# Patient Record
Sex: FEMALE
[De-identification: names, ages, dates, MRNs, and addresses within clinical notes are randomized per-mention and may not be internally consistent; named-entity substitution may affect disease eponyms.]

---

## 2019-02-08 ENCOUNTER — HOSPITAL ENCOUNTER (EMERGENCY)
Dept: HOSPITAL 14 - H.ER | Age: 34
Discharge: HOME | End: 2019-02-08
Payer: MEDICAID

## 2019-02-08 VITALS
DIASTOLIC BLOOD PRESSURE: 76 MMHG | TEMPERATURE: 98.7 F | OXYGEN SATURATION: 100 % | RESPIRATION RATE: 19 BRPM | SYSTOLIC BLOOD PRESSURE: 124 MMHG | HEART RATE: 84 BPM

## 2019-02-08 DIAGNOSIS — T78.3XXA: Primary | ICD-10-CM

## 2019-02-08 NOTE — ED PDOC
HPI: Allergic Reaction


Time Seen by Provider: 02/08/19 12:14


Chief Complaint (Nursing): Allergic Reaction


Chief Complaint (Provider): Allergic Reaction


History Per: Patient


History/Exam Limitations: no limitations


Onset/Duration Of Symptoms: Hrs (x1)


Current Symptoms Are (Timing): Still Present


Additional Complaint(s): 





33 year old female presents to the ED with an allergic reaction with itching and

swelling to her face that began 1 hour pta. Patient reports she ate peanuts 

earlier today and thinks the allergic reaction is from that.  Denies shortness 

of breath or rash.  She states she has itching on the neck and has facial 

swelling.  





PMD: none provided





Past Medical History


Reviewed: Historical Data, Nursing Documentation, Vital Signs


Vital Signs: 





                                Last Vital Signs











Temp  98.8 F   02/08/19 11:56


 


Pulse  94 H  02/08/19 11:56


 


Resp  18   02/08/19 11:56


 


BP  143/79   02/08/19 11:56


 


Pulse Ox  99   02/08/19 11:56














- Medical History


PMH: No Chronic Diseases





- Surgical History


Surgical History: Tonsillectomy





- Family History


Family History: States: Unknown Family Hx





- Home Medications


Home Medications: 


                                Ambulatory Orders











 Medication  Instructions  Recorded


 


Prednisone 50 mg PO DAILY #3 tablet 02/08/19














- Allergies


Allergies/Adverse Reactions: 


                                    Allergies











Allergy/AdvReac Type Severity Reaction Status Date / Time


 


No Known Allergies Allergy   Verified 02/08/19 11:59














Review of Systems


ROS Statement: Except As Marked, All Systems Reviewed And Found Negative


Respiratory: Negative for: Shortness of Breath


Skin: Positive for: Other (Facial swelling and itching; itching to the neck).  

Negative for: Rash





Physical Exam





- Reviewed


Nursing Documentation Reviewed: Yes


Vital Signs Reviewed: Yes





- Physical Exam


Appears: Positive for: Non-toxic, No Acute Distress


Head Exam: Positive for: ATRAUMATIC, NORMOCEPHALIC


Skin: Positive for: Normal Color, Warm, Dry


Eye Exam: Positive for: Normal appearance


Neck: Positive for: Normal, Painless ROM


Cardiovascular/Chest: Positive for: Regular Rate, Rhythm


Respiratory: Positive for: Normal Breath Sounds.  Negative for: Wheezing, 

Respiratory Distress


Extremity: Positive for: Normal ROM


Neurologic/Psych: Positive for: Alert, Oriented.  Negative for: Motor/Sensory 

Deficits


Comments: 





Facial swelling





- ECG


O2 Sat by Pulse Oximetry: 99 (RA)


Pulse Ox Interpretation: Normal





- Progress


Re-evaluation Time: 17:03


Condition: Re-examined, Improved





Disposition





- Clinical Impression


Clinical Impression: 


 Allergic reaction, Angioedema








- Patient ED Disposition


Is Patient to be Admitted: No


Doctor Will See Patient In The: Office


Counseled Patient/Family Regarding: Studies Performed, Diagnosis, Need For 

Followup





- Disposition


Referrals: 


[facility] [Outside]


Disposition: Routine/Home


Disposition Time: 16:30


Condition: GOOD


Additional Instructions: 





RAFAELA YO, thank you for letting us take care of you today. Your provider was

 Lam Donovan MD and you were treated for POSS ALLERGIC REACTION. The 

emergency medical care you received today was directed at your acute symptoms. 

If you were prescribed any medication, please fill it and take as directed. It 

may take several days for your symptoms to resolve. Return to the Emergency 

Department if your symptoms worsen, do not improve, or if you have any other 

problems.





Please contact your doctor or call one of the physicians/clinics you have been 

referred to that are listed on the Patient Visit Information form that is 

included in your discharge packet. Bring any paperwork you were given at 

discharge with you along with any medications you are taking to your follow up 

visit. Our treatment cannot replace ongoing medical care by a primary care 

provider outside of the emergency department.





Thank you for allowing the [facility] team to be part of your care today.








If you had an X-Ray or CT scan: A Radiologist will review the ED reading if any 

change in treatment is needed we will contact you.***





If you had a blood, urine, or wound culture: It will take several days for the 

results, if any change in treatment is needed we will contact you.***





If you had an STI test: It will take 48 hours for the results. Please call after

 1 week if you have not heard back.***


Prescriptions: 


Prednisone 50 mg PO DAILY #3 tablet


Instructions:  Angioedema (DC)





MDMA





- Impression/Plan/Differential Dx


Note:: 





Initial Impression: allergic reaction, angioedema of the face





Initial Plan: 


--Benadryl 25mg PO


--Pepcid 20mg PO


--Prednisone 60mg PO





15:25


Upon reevaluation, patient reports improvement in facial swelling.  She still 

reports palpitations and difficulty breathing.  EKG ordered.


----------------------------------------------------

---------------------------------------------


Scribe Attestation:


Documented by Simón Bailey acting as a scribe for Lam Donovan MD.





Provider Scribe Attestation:


All medical record entries made by the Scribe were at my direction and 

personally dictated by me. I have reviewed the chart and agree that the record 

accurately reflects my personal performance of the history, physical exam, 

medical decision making, and the department course for this patient. I have also

 personally directed, reviewed, and agree with the discharge instructions and 

disposition.

## 2019-02-09 NOTE — CARD
--------------- APPROVED REPORT --------------





Date of service: 02/08/2019



EKG Measurement

Heart [id number]

[id number]

[id number]

[id number]

[id number]



<Conclusion>

Normal sinus rhythm

Possible Left atrial enlargement

Borderline ECG